# Patient Record
Sex: FEMALE | Race: WHITE | NOT HISPANIC OR LATINO | Employment: OTHER | URBAN - METROPOLITAN AREA
[De-identification: names, ages, dates, MRNs, and addresses within clinical notes are randomized per-mention and may not be internally consistent; named-entity substitution may affect disease eponyms.]

---

## 2021-02-12 ENCOUNTER — OFFICE VISIT (OUTPATIENT)
Dept: URBAN - METROPOLITAN AREA CLINIC 27 | Facility: CLINIC | Age: 76
End: 2021-02-12

## 2021-02-18 ENCOUNTER — OFFICE VISIT (OUTPATIENT)
Dept: URBAN - METROPOLITAN AREA SURGERY CENTER 7 | Facility: SURGERY CENTER | Age: 76
End: 2021-02-18

## 2022-04-30 ENCOUNTER — TELEPHONE ENCOUNTER (OUTPATIENT)
Dept: URBAN - METROPOLITAN AREA CLINIC 121 | Facility: CLINIC | Age: 77
End: 2022-04-30

## 2022-04-30 RX ORDER — LISINOPRIL 10 MG/1
TABLET ORAL
OUTPATIENT
Start: 2009-08-07

## 2022-04-30 RX ORDER — VALSARTAN 40 MG/1
TABLET ORAL
OUTPATIENT
Start: 2015-12-16

## 2022-04-30 RX ORDER — SIMVASTATIN 10 MG/1
TABLET, FILM COATED ORAL
OUTPATIENT
Start: 2009-08-07 | End: 2015-12-16

## 2022-04-30 RX ORDER — ZOLPIDEM TARTRATE 5 MG
1 TABLETS PO QHS PRN TABLET ORAL
OUTPATIENT
Start: 2015-12-16

## 2022-04-30 RX ORDER — VALSARTAN 40 MG/1
TABLET ORAL
OUTPATIENT
Start: 2015-12-16 | End: 2019-07-26

## 2022-04-30 RX ORDER — ATENOLOL 25 MG/1
TABLET ORAL
OUTPATIENT
Start: 2015-12-16

## 2022-04-30 RX ORDER — TEA TREE OIL 100 %
SPRAY, NON-AEROSOL (ML) TOPICAL
OUTPATIENT
Start: 2013-04-19 | End: 2015-12-16

## 2022-04-30 RX ORDER — ZOLPIDEM TARTRATE 5 MG
1 TABLETS PO QHS PRN TABLET ORAL
OUTPATIENT
Start: 2015-12-16 | End: 2019-07-26

## 2022-04-30 RX ORDER — ATENOLOL 25 MG/1
TABLET ORAL
OUTPATIENT
Start: 2015-12-16 | End: 2019-07-26

## 2022-04-30 RX ORDER — SIMVASTATIN 10 MG/1
TABLET, FILM COATED ORAL
OUTPATIENT
Start: 2009-08-07

## 2022-04-30 RX ORDER — TEA TREE OIL 100 %
SPRAY, NON-AEROSOL (ML) TOPICAL
OUTPATIENT
Start: 2013-04-19

## 2022-04-30 RX ORDER — LISINOPRIL 10 MG/1
TABLET ORAL
OUTPATIENT
Start: 2009-08-07 | End: 2015-12-16

## 2022-05-01 ENCOUNTER — TELEPHONE ENCOUNTER (OUTPATIENT)
Dept: URBAN - METROPOLITAN AREA CLINIC 121 | Facility: CLINIC | Age: 77
End: 2022-05-01

## 2022-05-01 RX ORDER — ATORVASTATIN CALCIUM 20 MG/1
TABLET, FILM COATED ORAL
Status: ACTIVE | COMMUNITY
Start: 2015-12-16

## 2022-05-01 RX ORDER — ASPIRIN 81 MG/1
TABLET ORAL
Status: ACTIVE | COMMUNITY
Start: 2015-12-16

## 2022-05-01 RX ORDER — LOSARTAN POTASSIUM 100 MG/1
TABLET, FILM COATED ORAL
Status: ACTIVE | COMMUNITY
Start: 2019-07-26

## 2022-05-01 RX ORDER — MULTIVIT-MIN/FOLIC/VIT K/LYCOP 400-300MCG
TABLET ORAL
Status: ACTIVE | COMMUNITY
Start: 2015-12-16

## 2022-05-01 RX ORDER — METHYLPREDNISOLONE 4 MG
1 DOSE PACK ORALLY AS DIRECTED TABLET, DOSE PACK ORAL
Status: ACTIVE | COMMUNITY
Start: 2019-07-26

## 2022-05-01 RX ORDER — MELATONIN/PYRIDOXINE HCL (B6) 5 MG-10 MG
TABLET,IMMED, EXTENDED RELEASE, BIPHASIC ORAL
Status: ACTIVE | COMMUNITY
Start: 2013-04-19

## 2022-05-01 RX ORDER — ACETAMINOPHEN 500 MG
TABLET ORAL
Status: ACTIVE | COMMUNITY
Start: 2015-12-16

## 2022-05-01 RX ORDER — ELECTROLYTES/DEXTROSE
SOLUTION, ORAL ORAL
Status: ACTIVE | COMMUNITY
Start: 2015-12-16

## 2022-05-10 ENCOUNTER — TELEPHONE ENCOUNTER (OUTPATIENT)
Dept: URBAN - METROPOLITAN AREA CLINIC 27 | Facility: CLINIC | Age: 77
End: 2022-05-10

## 2023-02-01 ENCOUNTER — APPOINTMENT (RX ONLY)
Dept: URBAN - METROPOLITAN AREA OTHER 5 | Facility: OTHER | Age: 78
Setting detail: DERMATOLOGY
End: 2023-02-01

## 2023-02-01 DIAGNOSIS — L28.1 PRURIGO NODULARIS: ICD-10-CM

## 2023-02-01 DIAGNOSIS — B37.2 CANDIDIASIS OF SKIN AND NAIL: ICD-10-CM

## 2023-02-01 PROCEDURE — ? PRESCRIPTION MEDICATION MANAGEMENT

## 2023-02-01 PROCEDURE — ? TREATMENT REGIMEN

## 2023-02-01 PROCEDURE — ? COUNSELING

## 2023-02-01 PROCEDURE — 17360 CHEMICAL EXFOLIATION ACNE: CPT

## 2023-02-01 PROCEDURE — 99203 OFFICE O/P NEW LOW 30 MIN: CPT | Mod: 25

## 2023-02-01 PROCEDURE — ? CHEMICAL EXFOLIATION

## 2023-02-01 PROCEDURE — ? PRESCRIPTION

## 2023-02-01 RX ORDER — CICLOPIROX OLAMINE 7.7 MG/G
CREAM TOPICAL
Qty: 90 | Refills: 1 | Status: ERX | COMMUNITY
Start: 2023-02-01

## 2023-02-01 RX ADMIN — CICLOPIROX OLAMINE: 7.7 CREAM TOPICAL at 00:00

## 2023-02-01 ASSESSMENT — LOCATION SIMPLE DESCRIPTION DERM
LOCATION SIMPLE: RIGHT BUTTOCK
LOCATION SIMPLE: RIGHT BREAST
LOCATION SIMPLE: RIGHT SHOULDER
LOCATION SIMPLE: LEFT BREAST
LOCATION SIMPLE: LEFT SHOULDER

## 2023-02-01 ASSESSMENT — LOCATION DETAILED DESCRIPTION DERM
LOCATION DETAILED: RIGHT MEDIAL BREAST 5-6:00 REGION
LOCATION DETAILED: LEFT MEDIAL BREAST 6-7:00 REGION
LOCATION DETAILED: LEFT POSTERIOR SHOULDER
LOCATION DETAILED: RIGHT POSTERIOR SHOULDER
LOCATION DETAILED: RIGHT BUTTOCK

## 2023-02-01 ASSESSMENT — LOCATION ZONE DERM
LOCATION ZONE: TRUNK
LOCATION ZONE: ARM

## 2023-02-01 NOTE — PROCEDURE: TREATMENT REGIMEN
Detail Level: Zone
Initiate Treatment: Apply Apple cider vinegar to affected areas daily until improved.

## 2023-02-01 NOTE — PROCEDURE: CHEMICAL EXFOLIATION
Number Of Coats: 1
Prep: The treated area was degreased with pre-peel cleanser, and vaseline was applied for protection of mucous membranes.
Consent: Prior to the procedure, written consent was obtained and risks were reviewed, including but not limited to: redness, peeling, blistering, pigmentary change, scarring, infection, and pain.
Peel Neutralization: self-neutralization
Post-Procedure (Optional): After the procedure, the treatment areas were washed with soap and water, and a post-peel cream was applied. Sun protection and post-care instructions were reviewed with the patient.
Detail Level: Detailed
Treatment Number: 0
Exfoliation Method: Chemical Peel
Chemical Peel: 5% TCA
Post-Care Instructions: I reviewed with the patient in detail post-care instructions. Patient should avoid sun exposure and wear sun protection.
Add Post Care To The Note?: no

## 2023-02-01 NOTE — PROCEDURE: PRESCRIPTION MEDICATION MANAGEMENT
Render In Strict Bullet Format?: No
Detail Level: Zone
Initiate Treatment: Loprox (as olamine) 0.77 % topical cream\\nSig: Apply to affected areas under breast twice daily.